# Patient Record
Sex: MALE | Race: WHITE | NOT HISPANIC OR LATINO | Employment: PART TIME | URBAN - METROPOLITAN AREA
[De-identification: names, ages, dates, MRNs, and addresses within clinical notes are randomized per-mention and may not be internally consistent; named-entity substitution may affect disease eponyms.]

---

## 2023-11-07 ENCOUNTER — CLINICAL SUPPORT (OUTPATIENT)
Dept: AUDIOLOGY | Facility: CLINIC | Age: 63
End: 2023-11-07
Payer: COMMERCIAL

## 2023-11-07 DIAGNOSIS — H93.8X1 BLOCKED EAR, RIGHT: ICD-10-CM

## 2023-11-07 DIAGNOSIS — H92.01 OTALGIA OF RIGHT EAR: Primary | ICD-10-CM

## 2023-11-07 PROCEDURE — 92557 COMPREHENSIVE HEARING TEST: CPT | Performed by: AUDIOLOGIST

## 2023-11-07 PROCEDURE — 92550 TYMPANOMETRY & REFLEX THRESH: CPT | Performed by: AUDIOLOGIST

## 2023-11-07 ASSESSMENT — ENCOUNTER SYMPTOMS: OCCASIONAL FEELINGS OF UNSTEADINESS: 0

## 2023-11-07 ASSESSMENT — PAIN - FUNCTIONAL ASSESSMENT: PAIN_FUNCTIONAL_ASSESSMENT: 0-10

## 2023-11-07 ASSESSMENT — PAIN SCALES - GENERAL: PAINLEVEL_OUTOF10: 0 - NO PAIN

## 2023-11-07 NOTE — PROGRESS NOTES
AUDIOLOGY ADULT AUDIOMETRIC EVALUATION      Name:  Alejandro Womack  :  1960  Age:  63 y.o.  Date of Evaluation: 23    History:  Reason for visit:  Mr. Alejandro Womack was seen today as part of the visit with David Ferreira D.O. for an evaluation of hearing.   Chief Complaint   Patient presents with    Earache     Blocked ear and otalgia     Reported for the past 1.5 months he has noticed a sensation of a left sided earache with a feeling of a blocked ear. Stated he has a feeling like the right ear is blocked and there is some possible fluid inside the ear. Feels like someone has a hand cupped over the ear.   He has noticed a slight continued ache behind the right ear and down into his throat.   He reported has completed steroids and antibiotics, however, continues to experience symptoms. Stated he has a slight sensation of feeling off-balance at times.   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   Denied any current otalgia, tinnitus, ear pressure, dizziness/vertigo, ear surgery, recent falls,  sinus/throat concerns, ear drainage, or sudden hearing loss.    EVALUATION      See Audiogram    RESULTS:    Otoscopic Evaluation:   Right Ear: Otoscopy for the right ear revealed a clear healthy canal and a healthy tympanic membrane was visualized.   Left Ear: Otoscopy for the left ear revealed a clear healthy canal and a healthy tympanic membrane was visualized.     Immittance:  Immittance Measures: 226 Hz   Right Ear: Tympanometric testing revealed a normal type A tympanogram with normal middle ear pressure and normal static compliance.  Left Ear: Tympanometric testing revealed a normal type A tympanogram with normal middle ear pressure and normal static compliance.    Right Ear: Ipsilateral acoustic reflexes were present at, 500-4,000 Hz, at expected sensation levels.  Left Ear: Ipsilateral acoustic reflexes were present at, 500-4,000 Hz, at expected sensation  levels.    Test technique:  Pure Tone Audiometry via insert earphones    Reliability:   excellent    Pure Tone Audiometry:    Right Ear: Audiometric testing of the right ear using insert earphones indicated normal peripheral hearing sensitivity from 250-8,000 Hz.   Left Ear:   Audiometric testing of the left ear using insert earphones indicated normal peripheral hearing sensitivity from 250-8,000 Hz.       Speech Audiometry:   Right Ear:  Speech Reception Threshold (SRT) was obtained at 10 dBHL                 Word Recognition scores were excellent (100%) in quiet when words were presented at 50 dBHL  Left Ear:  Speech Reception Threshold (SRT) was obtained at 10 dBHL                 Word Recognition scores were excellent (100%) in quiet when words were presented at 50 dBHL  Testing was performed with recorded NU-6 speech words in quiet. Speech thresholds were in good agreement with the pure tone averages in each ear.     IMPRESSIONS:  Today's test results are normal hearing sensitivity.  The patient was counseled with regard to the findings.    RECOMMENDATIONS:  * Continue medical follow up with David Ferreira D.O.  * Retest as medically indicated, or sooner if a change in hearing sensitivity is noticed.   * Wear hearing protection while in the presence of loud sounds.   * Use tinnitus coping strategies as needed, such as sound apps on a smart phone, utilizing calming noise in the room, running a fan at night, etc.   * Use effective communication strategies such as asking the speaker to gain attention prior to speaking, speaking in the same room, repeating words that were heard, etc.    PATIENT EDUCATION:   Discussed results and recommendations with the patient and a copy of the hearing test was provided.  Questions were addressed and the patient was encouraged to contact our department should concerns arise.  The patient was seen from 3:00-3:30 pm.

## 2023-11-08 ENCOUNTER — OFFICE VISIT (OUTPATIENT)
Dept: OTOLARYNGOLOGY | Facility: CLINIC | Age: 63
End: 2023-11-08
Payer: COMMERCIAL

## 2023-11-08 VITALS
BODY MASS INDEX: 36.13 KG/M2 | DIASTOLIC BLOOD PRESSURE: 93 MMHG | SYSTOLIC BLOOD PRESSURE: 153 MMHG | TEMPERATURE: 97.9 F | WEIGHT: 238.4 LBS | HEIGHT: 68 IN

## 2023-11-08 DIAGNOSIS — H92.02 LEFT EAR PAIN: Primary | ICD-10-CM

## 2023-11-08 DIAGNOSIS — M54.2 NECK PAIN ON LEFT SIDE: ICD-10-CM

## 2023-11-08 PROCEDURE — 1036F TOBACCO NON-USER: CPT | Performed by: OTOLARYNGOLOGY

## 2023-11-08 PROCEDURE — 99203 OFFICE O/P NEW LOW 30 MIN: CPT | Performed by: OTOLARYNGOLOGY

## 2023-11-08 RX ORDER — CIPROFLOXACIN HYDROCHLORIDE 3 MG/ML
1-2 SOLUTION/ DROPS OPHTHALMIC 4 TIMES DAILY
COMMUNITY
Start: 2014-10-03

## 2023-11-08 RX ORDER — FLUTICASONE FUROATE 27.5 UG/1
SPRAY, METERED NASAL
COMMUNITY
Start: 2023-10-23

## 2023-11-08 RX ORDER — PRIMIDONE 250 MG/1
250 TABLET ORAL 3 TIMES DAILY
COMMUNITY

## 2023-11-08 RX ORDER — TAMSULOSIN HYDROCHLORIDE 0.4 MG/1
0.4 CAPSULE ORAL DAILY
COMMUNITY

## 2023-11-08 NOTE — PROGRESS NOTES
Impression:  1. Left ear pain        2. Neck pain on left side           RECOMMENDATIONS/PLAN :  I reassured the patient there is no evidence of any middle ear fluid and both tympanic membranes are moving normally today.  More than likely he is having referred pain from his left neck and they mention that he may benefit from some physical therapy on his neck or he may benefit from seeing a chiropractor.  I want him to continue Flonase nasal spray-2 puffs each nostril daily for the next few weeks and then he can stop.  I would be happy to clean his ears out and remove any future cerumen and he will use Debrox wax softening drops for about 5 days before coming in in the future.  All of his concerns were addressed today.      **This electronic medical record note was created with the use of voice recognition software.  Despite proofreading, typographical or grammatical errors may be present that could affect meaning of content **    Subjective   Patient ID:     Alejandro Womack is a 63 y.o. male who presents to the office today complaining of left-sided ear pain with pain behind the ear radiating down his left neck that started about 2 months ago.  He did not have any upper respiratory complaints fever chills or any nasal congestion.  He was told through urgent care that he had fluid in his middle ear space however he has not noticed any sloshing around in that left ear.  He does have some persistent discomfort along his left neck and behind his left ear.  He denies any drainage from the ears or any fever or chills.    ROS:  A detailed 12 system review of systems is noted on the intake form has been reviewed with the patient with details noted in the HPI and scanned into the patient's medical record.    Objective     No past medical history on file.     No past surgical history on file.     Allergies   Allergen Reactions    Antihistamines - Alkylamine GI Upset     Prostates issues    Exacerbates enlarged prostate     "      Current Outpatient Medications:     ciprofloxacin (Ciloxan) 0.3 % ophthalmic solution, Administer 1-2 drops into affected eye(s) 4 times a day., Disp: , Rfl:     Flonase Sensimist 27.5 mcg/actuation nasal spray, USE 2 SPRAYS NASALLY EVERY 12 HOURS, Disp: , Rfl:     primidone (Mysoline) 250 mg tablet, Take 1 tablet (250 mg) by mouth 3 times a day., Disp: , Rfl:     tamsulosin (Flomax) 0.4 mg 24 hr capsule, Take 1 capsule (0.4 mg) by mouth once daily., Disp: , Rfl:     vitamin D3-vitamin K2 1,250-200 mcg capsule, 50,000 Int'l Units., Disp: , Rfl:      Tobacco Use: Medium Risk (11/8/2023)    Patient History     Smoking Tobacco Use: Former     Smokeless Tobacco Use: Never     Passive Exposure: Not on file        Alcohol Use: Not on file        Social History     Substance and Sexual Activity   Drug Use Not on file        Physical Exam:  Visit Vitals  BP (!) 153/93   Temp 36.6 °C (97.9 °F) (Temporal)   Ht 1.727 m (5' 8\")   Wt 108 kg (238 lb 6.4 oz)   BMI 36.25 kg/m²   Smoking Status Former   BSA 2.28 m²      General: Patient is alert, oriented, cooperative in no apparent distress.  Head: Normocephalic, atraumatic.  Eyes: PERRL, EOMI, Conjunctiva is clear. No nystagmus.  Ears: Right Ear-- Pinna is normal.  External auditory canal is patent, no lesions. Tympanic membrane is [intact, translucent and has good mobility with my pneumatic otoscope. No effusion].  Mastoid is nontender.  No TMJ tenderness or popping  Left ear-- Pinna is normal.  External auditory canal is patent, no lesions.  Tympanic membrane is [intact, translucent and has good mobility with my pneumatic otoscope.  No effusion].  Mastoid is nontender.  No TMJ tenderness or popping  Nose: Septum is straight.  No septal perforation or lesions. No septal hematoma/ seroma.  No signs of bleeding.  Inferior turbinates are normal.   No evidence of intranasal polyps.    Throat:  Floor of mouth is clear, no masses.  Tongue appears normal, no lesions or masses. " Gums, gingiva, buccal mucosa appear pink and moist, no lesions. Teeth are in good repair.  No obvious dental infections.  Peritonsillar regions appear symmetric without swelling.  Hard and soft palate appear normal, no obvious cleft. Uvula is midline.  Oropharynx: No lesions. Retropharyngeal wall is flat.  No active postnasal drip.  Neck: Supple,  no lymphadenopathy.  No masses.  Salivary Glands: Symmetric bilaterally.  No palpable masses.  No evidence of acute infection or salivary stones  Neurologic: Cranial Nerves 2-12 are grossly intact without focal deficits. Cerebellar function testing is normal.     Results:   I reviewed his recent audiogram and his hearing is within normal limits for both ears.  Both tympanic membranes have normal mobility on tympanometry.  Word recognition scores are 100% bilaterally.  Speech reception threshold is 10 dB bilaterally  Procedure:   []    David Ferreira, DO

## 2023-11-09 ENCOUNTER — APPOINTMENT (OUTPATIENT)
Dept: OTOLARYNGOLOGY | Facility: CLINIC | Age: 63
End: 2023-11-09
Payer: COMMERCIAL

## 2023-11-21 ENCOUNTER — APPOINTMENT (OUTPATIENT)
Dept: OTOLARYNGOLOGY | Facility: CLINIC | Age: 63
End: 2023-11-21
Payer: COMMERCIAL

## 2023-11-21 ENCOUNTER — APPOINTMENT (OUTPATIENT)
Dept: AUDIOLOGY | Facility: CLINIC | Age: 63
End: 2023-11-21
Payer: COMMERCIAL

## 2025-02-12 ENCOUNTER — TELEPHONE (OUTPATIENT)
Dept: PAIN MEDICINE | Facility: CLINIC | Age: 65
End: 2025-02-12
Payer: COMMERCIAL

## 2025-03-05 ENCOUNTER — OFFICE VISIT (OUTPATIENT)
Dept: URGENT CARE | Age: 65
End: 2025-03-05
Payer: MEDICARE

## 2025-03-05 VITALS
OXYGEN SATURATION: 95 % | TEMPERATURE: 98.4 F | WEIGHT: 240 LBS | BODY MASS INDEX: 35.55 KG/M2 | SYSTOLIC BLOOD PRESSURE: 163 MMHG | HEIGHT: 69 IN | RESPIRATION RATE: 20 BRPM | DIASTOLIC BLOOD PRESSURE: 78 MMHG | HEART RATE: 110 BPM

## 2025-03-05 DIAGNOSIS — L30.9 DERMATITIS: Primary | ICD-10-CM

## 2025-03-05 PROCEDURE — 3008F BODY MASS INDEX DOCD: CPT | Performed by: FAMILY MEDICINE

## 2025-03-05 PROCEDURE — 99213 OFFICE O/P EST LOW 20 MIN: CPT | Performed by: FAMILY MEDICINE

## 2025-03-05 PROCEDURE — 1126F AMNT PAIN NOTED NONE PRSNT: CPT | Performed by: FAMILY MEDICINE

## 2025-03-05 RX ORDER — CEFADROXIL 500 MG/1
1 CAPSULE ORAL
COMMUNITY
Start: 2025-03-01

## 2025-03-05 RX ORDER — MUPIROCIN 20 MG/G
OINTMENT TOPICAL 3 TIMES DAILY
Qty: 22 G | Refills: 0 | Status: SHIPPED | OUTPATIENT
Start: 2025-03-05 | End: 2025-03-12

## 2025-03-05 RX ORDER — TRIAMCINOLONE ACETONIDE 1 MG/G
OINTMENT TOPICAL 2 TIMES DAILY PRN
Qty: 15 G | Refills: 0 | Status: SHIPPED | OUTPATIENT
Start: 2025-03-05 | End: 2026-03-05

## 2025-03-05 ASSESSMENT — PAIN SCALES - GENERAL: PAINLEVEL_OUTOF10: 0-NO PAIN

## 2025-03-05 NOTE — PATIENT INSTRUCTIONS
Apply the 2 medicated creams as directed.  Avoid scratching or itching the area.  Follow-up in 1 week if symptoms do not start improving.

## 2025-03-05 NOTE — PROGRESS NOTES
Subjective   Patient ID: Alejandro Womack is a 65 y.o. male. They present today with a chief complaint of Other (Middle of back open sore x 2 weeks ).    Patient disposition: Home    History of Present Illness  HPI  Wound on mid back for the past 2 years.  Occasionally flares up when he takes antibiotics for prostatitis.  Recently had it about 3 weeks ago, feels it becomes more itchy and increase in size.  No drainage.  No fever or chills.  Has been using over-the-counter Neosporin and cortisone with minimal improvement of symptoms.  History of eczema.      Past Medical History  Allergies as of 03/05/2025 - Reviewed 03/05/2025   Allergen Reaction Noted    Antihistamines - alkylamine GI Upset 03/02/2010       (Not in a hospital admission)       Current Outpatient Medications   Medication Sig Dispense Refill    cefadroxil (Duricef) 500 mg capsule Take 1 capsule by mouth every 12 hours.      ciprofloxacin (Ciloxan) 0.3 % ophthalmic solution Administer 1-2 drops into affected eye(s) 4 times a day.      Flonase Sensimist 27.5 mcg/actuation nasal spray USE 2 SPRAYS NASALLY EVERY 12 HOURS      mupirocin (Bactroban) 2 % ointment Apply topically 3 times a day for 7 days. 22 g 0    primidone (Mysoline) 250 mg tablet Take 1 tablet (250 mg) by mouth 3 times a day.      tamsulosin (Flomax) 0.4 mg 24 hr capsule Take 1 capsule (0.4 mg) by mouth once daily.      triamcinolone (Kenalog) 0.1 % ointment Apply topically 2 times a day as needed for irritation or rash. 15 g 0    vitamin D3-vitamin K2 1,250-200 mcg capsule 50,000 Int'l Units.       No current facility-administered medications for this visit.       There is no problem list on file for this patient.      No past surgical history on file.     reports that he quit smoking about 22 years ago. His smoking use included cigarettes. He started smoking about 42 years ago. He has a 20 pack-year smoking history. He has never used smokeless tobacco.    Review of Systems  As noted in  "HPI. ROS otherwise negative unless noted.       Objective    Vitals:    03/05/25 1709   BP: 163/78   BP Location: Right arm   Patient Position: Sitting   Pulse: 110   Resp: 20   Temp: 36.9 °C (98.4 °F)   TempSrc: Oral   SpO2: 95%   Weight: 109 kg (240 lb)   Height: 1.753 m (5' 9\")     No LMP for male patient.    Physical Exam  Constitutional: vital signs reviewed. Well developed, well nourished. patient alert and patient without distress.   Psych: Normal mood and affect  Skin: Normal skin color and pigmentation, normal skin turgor, and mid lower thoracic with 2 cm round plaque-like lesion.  Scaly.  Dry.  Nontender.  Slightly itchy.  No drainage.  No surrounding tenderness.  No surrounding erythema.  Lymphatic: No extremity edema  Cardiovascular: No edema in the extremities. Normal skin color/perfusion.   Pulmonary: Skin without cyanosis. Patient without respiratory distress. Speaking in full sentences.  Musculoskeletal: Normal gait and stance, balance and coordination without gross deficit.        Procedures    Point of Care Test & Imaging Results from this visit           Diagnostic study results (if any) were reviewed.    Assessment/Plan   Allergies, medications, history, and pertinent labs/EKGs/Imaging reviewed.    Medical Decision Making  See note    Orders and Diagnoses  Diagnoses and all orders for this visit:  Dermatitis  -     mupirocin (Bactroban) 2 % ointment; Apply topically 3 times a day for 7 days.  -     triamcinolone (Kenalog) 0.1 % ointment; Apply topically 2 times a day as needed for irritation or rash.      Medical Admin Record      Follow Up Instructions  No follow-ups on file.    At time of discharge patient was clinically well-appearing and HDS for outpatient management. The patient and/or family was educated regarding diagnosis, supportive care, OTC and Rx medications. The patient and/or family was given the opportunity to ask questions prior to discharge and all questions answered. They " verbalized understanding of my discussion of the plans for treatment, expected course, indications to return to UC or seek further evaluation in ED, and the need for timely follow up as directed.      Electronically signed by [facility]